# Patient Record
Sex: FEMALE | Race: WHITE
[De-identification: names, ages, dates, MRNs, and addresses within clinical notes are randomized per-mention and may not be internally consistent; named-entity substitution may affect disease eponyms.]

---

## 2018-09-13 ENCOUNTER — HOSPITAL ENCOUNTER (INPATIENT)
Dept: HOSPITAL 14 - H.ER | Age: 38
LOS: 3 days | Discharge: HOME | DRG: 883 | End: 2018-09-16
Attending: SPECIALIST | Admitting: SPECIALIST
Payer: COMMERCIAL

## 2018-09-13 VITALS — BODY MASS INDEX: 24.2 KG/M2

## 2018-09-13 DIAGNOSIS — J45.909: ICD-10-CM

## 2018-09-13 DIAGNOSIS — K35.2: Primary | ICD-10-CM

## 2018-09-13 LAB
ALBUMIN SERPL-MCNC: 4.2 G/DL (ref 3.5–5)
ALBUMIN/GLOB SERPL: 1.3 {RATIO} (ref 1–2.1)
ALT SERPL-CCNC: 26 U/L (ref 9–52)
APTT BLD: 34.3 SECONDS (ref 25.6–37.1)
AST SERPL-CCNC: 24 U/L (ref 14–36)
BACTERIA #/AREA URNS HPF: (no result) /[HPF]
BASOPHILS # BLD AUTO: 0 K/UL (ref 0–0.2)
BASOPHILS NFR BLD: 0.2 % (ref 0–2)
BILIRUB UR-MCNC: NEGATIVE MG/DL
BUN SERPL-MCNC: 12 MG/DL (ref 7–17)
CALCIUM SERPL-MCNC: 8.9 MG/DL (ref 8.4–10.2)
COLOR UR: YELLOW
EOSINOPHIL # BLD AUTO: 0.2 K/UL (ref 0–0.7)
EOSINOPHIL NFR BLD: 1.9 % (ref 0–4)
ERYTHROCYTE [DISTWIDTH] IN BLOOD BY AUTOMATED COUNT: 13.1 % (ref 11.5–14.5)
GFR NON-AFRICAN AMERICAN: > 60
GLUCOSE UR STRIP-MCNC: (no result) MG/DL
HGB BLD-MCNC: 13.5 G/DL (ref 12–16)
INR PPP: 1.1
LEUKOCYTE ESTERASE UR-ACNC: (no result) LEU/UL
LIPASE SERPL-CCNC: 117 U/L (ref 23–300)
LYMPHOCYTES # BLD AUTO: 1.8 K/UL (ref 1–4.3)
LYMPHOCYTES NFR BLD AUTO: 18.2 % (ref 20–40)
MCH RBC QN AUTO: 30.7 PG (ref 27–31)
MCHC RBC AUTO-ENTMCNC: 34.1 G/DL (ref 33–37)
MCV RBC AUTO: 89.9 FL (ref 81–99)
MONOCYTES # BLD: 0.8 K/UL (ref 0–0.8)
MONOCYTES NFR BLD: 7.9 % (ref 0–10)
NEUTROPHILS # BLD: 7 K/UL (ref 1.8–7)
NEUTROPHILS NFR BLD AUTO: 71.8 % (ref 50–75)
NRBC BLD AUTO-RTO: 0.1 % (ref 0–0)
PH UR STRIP: 7 [PH] (ref 5–8)
PLATELET # BLD: 248 K/UL (ref 130–400)
PMV BLD AUTO: 8.7 FL (ref 7.2–11.7)
PROT UR STRIP-MCNC: NEGATIVE MG/DL
PROTHROMBIN TIME: 12.1 SECONDS (ref 9.8–13.1)
RBC # BLD AUTO: 4.4 MIL/UL (ref 3.8–5.2)
RBC # UR STRIP: NEGATIVE /UL
SP GR UR STRIP: 1.02 (ref 1–1.03)
SQUAMOUS EPITHIAL: 13 /HPF (ref 0–5)
URINE CLARITY: (no result)
UROBILINOGEN UR-MCNC: (no result) MG/DL (ref 0.2–1)
WBC # BLD AUTO: 9.7 K/UL (ref 4.8–10.8)

## 2018-09-13 RX ADMIN — WATER SCH MLS/HR: 1 INJECTION INTRAMUSCULAR; INTRAVENOUS; SUBCUTANEOUS at 21:33

## 2018-09-13 NOTE — ED PDOC
HPI: Abdomen


History Per: Patient


History/Exam Limitations: no limitations


Onset/Duration Of Symptoms: Days


Current Symptoms Are (Timing): Still Present


Severity: Moderate


Location Of Pain/Discomfort: RLQ, Epigastric


Associated Symptoms: Loss Of Appetite





<Aiden Beatty - Last Filed: 09/13/18 17:22>





<Jace Mendoza III - Last Filed: 10/02/18 14:36>


Time Seen by Provider: 09/13/18 12:15


Chief Complaint (Nursing): Abdominal Pain


Additional Complaint(s): 


37-year-old female, presents to the emergency department with complaints of 

epigastric abdominal pain ongoing for the past three days. Pain is intermittent 

in nature and associated with a decreased appetite. Pt states pain became more 

constant over the past two days, radiating to the right lower quadrant. LMP 

08/22. Patient denies hx of abdominal surgeries, nausea/vomiting, diarrhea, 

dysuria, fever or chills. No other complaints at this time. (Aiden Beatty)


37-year-old female, presents to the emergency department with complaints of 

epigastric abdominal pain ongoing for the past three days. Pain is intermittent 

in nature and associated with a decreased appetite. Pt states pain became more 

constant over the past two days, radiating to the right lower quadrant. LMP 

08/22. Patient denies hx of abdominal surgeries, nausea/vomiting, diarrhea, 

dysuria, fever or chills. No other complaints at this time. (Jace Mendoza III)





Supervising Attending Note





- Attestation:


I have reviewed all pertinent clinical information: Yes





<Jace Mendoza III - Last Filed: 10/02/18 14:36>





Past Medical History


Reviewed: Historical Data, Vital Signs





- Medical History


PMH: Asthma





- Family History


Family History: States: No Known Family Hx





<Aiden Beatty - Last Filed: 09/13/18 17:22>





<Jace Mendoza III - Last Filed: 10/02/18 14:36>


Vital Signs: 





                                Last Vital Signs











Temp  98.3 F   09/16/18 07:55


 


Pulse  65   09/16/18 07:55


 


Resp  18   09/16/18 07:55


 


BP  96/63 L  09/16/18 07:55


 


Pulse Ox  99   09/16/18 07:55














- Home Medications


Home Medications: 


                                Ambulatory Orders











 Medication  Instructions  Recorded


 


Albuterol HFA [Ventolin HFA 90 2 puff IH PRN PRN 05/31/16





mcg/actuation (8 g)]  


 


Docusate Sodium [Colace] 100 mg PO BID #30 capsule 09/16/18


 


RX: Ibuprofen [Motrin Tab] 800 mg PO Q8H PRN #21 tab 09/16/18














- Allergies


Allergies/Adverse Reactions: 


                                    Allergies











Allergy/AdvReac Type Severity Reaction Status Date / Time


 


No Known Allergies Allergy   Verified 05/27/16 12:53














Physical Exam





- Reviewed


Nursing Documentation Reviewed: Yes


Vital Signs Reviewed: Yes





- Physical Exam


Appears: Positive for: Non-toxic, No Acute Distress


Head Exam: Positive for: ATRAUMATIC, NORMOCEPHALIC


Skin: Positive for: Normal Color, Warm, Dry.  Negative for: Rash


Eye Exam: Positive for: Normal appearance


Neck: Positive for: Painless ROM


Cardiovascular/Chest: Positive for: Regular Rate, Rhythm.  Negative for: Murmur


Respiratory: Positive for: Normal Breath Sounds.  Negative for: Decreased Breath

Sounds, Accessory Muscle Use


Gastrointestinal/Abdominal: Positive for: Soft, Tenderness (+RLQ, Mild 

epigastric.).  Negative for: Mass, Guarding, Rebound


Back: Negative for: L CVA Tenderness, R CVA Tenderness


Extremity: Positive for: Normal ROM.  Negative for: Deformity


Neurologic/Psych: Positive for: Alert





<Aiden Baetty - Last Filed: 09/13/18 17:22>





- Laboratory Results


Result Diagrams: 


                                 09/13/18 13:20





                                 09/13/18 13:20





- ECG


O2 Sat by Pulse Oximetry: 99


Pulse Ox Interpretation: Normal (RA)





<Aiden Beatty - Last Filed: 09/13/18 17:22>





- Laboratory Results


Result Diagrams: 


                                 09/16/18 05:24





                                 09/15/18 08:50





<Jace Mendoza III - Last Filed: 10/02/18 14:36>





- Progress


ED Course And Treament: 








NS  1 LITER 500 ML PER HOUR:





TRANSVAGINAL US:





IMPRESSION:


No evidence of fibroid uterus or focal endometrial abnormality.





Moderate amount of free fluid in the pelvis which could be physiologic or 

related to cyst rupture.








CT ABD/PELVIS:





IMPRESSION:


Acute appendicitis with inflammatory changes extending to the terminal ileum and

cecum consistent with ruptured appendix and walled-off phlegmon.





Communication of results: I discussed findings directly with physician assistant

Aiden Beatty in the emergency department at 16:11





CALL PLACED TO SURG 16:18








D/W SURG RES 16:24.  WE WILL CALL ATTENDING TO DISCUSS AS THEY ARE AT [facility] 

[facility]





D/W DR. BERUMEN 16:48; ADMIT TO FRANNIE; KEEP NPO; ZYOSYN 3.375GM Q8 (Aiden Beatty)








NS  1 LITER 500 ML PER HOUR:





TRANSVAGINAL US:





IMPRESSION:


No evidence of fibroid uterus or focal endometrial abnormality.





Moderate amount of free fluid in the pelvis which could be physiologic or 

related to cyst rupture.








CT ABD/PELVIS:





IMPRESSION:


Acute appendicitis with inflammatory changes extending to the terminal ileum and

cecum consistent with ruptured appendix and walled-off phlegmon.





Communication of results: I discussed findings directly with physician assistant

Aiden Beatty in the emergency department at 16:11





CALL PLACED TO SURG 16:18








D/W SURG RES 16:24.  WE WILL CALL ATTENDING TO DISCUSS AS THEY ARE AT [facility] IN 

[facility] 3.375GM IV





D/W DR. BERUMEN 16:48; ADMIT TO FRANNIE; KEEP NPO; ZYOSYN 3.375GM Q8 (Jace Mendoza III)





Medical Decision Making





<Aiden Beatty - Last Filed: 09/13/18 17:22>





<Jace Mendoza III - Last Filed: 10/02/18 14:36>


Medical Decision Making: 


Impression:


Abdominal pain





Plan:


* Bloodwork


* CT Abd/Pel


* UA


* Reassess and Disposition





Scribe Attestation:


Documented by Rebecca Gallegos, acting as a scribe for LUIS DANIEL Mendoza. 


 


Provider Scribe Attestation:


All medical record entries made by the Scribe were at my direction and 

personally dictated by me. I have reviewed the chart and agree that the record 

accurately reflects my personal performance of the history, physical exam, 

medical decision making, and the department course for this patient. I have also

personally directed, reviewed, and agree with the discharge instructions and 

disposition.


 (Aiden Beatty)


Impression:


Abdominal pain





Plan:


* Bloodwork


* CT Abd/Pel


* UA


* Reassess and Disposition





Scribe Attestation:


Documented by Rebecca Gallegos, acting as a scribe for LUIS DANIEL Mendoza. 


 


Provider Scribe Attestation:


All medical record entries made by the Scribe were at my direction and 

personally dictated by me. I have reviewed the chart and agree that the record 

accurately reflects my personal performance of the history, physical exam, m

edical decision making, and the department course for this patient. I have also 

personally directed, reviewed, and agree with the discharge instructions and 

disposition.


 (Jace Mendoza III)





Disposition





- Patient ED Disposition


Is Patient to be Admitted: Yes





- Disposition


Disposition Time: 16:48





- Pt Status Changed To:


Hospital Disposition Of: Inpatient





- Admit Certification


Admit to Inpatient:: After my assessment, the patient will require 

hospitalization for at least two midnights.  This is because of the severity of 

symptoms shown, intensity of services needed, and/or the medical risk in this 

patient being treated as an outpatient.





<Aiden Beatty - Last Filed: 09/13/18 17:22>





<Jace Mendoza III - Last Filed: 10/02/18 14:36>





- Clinical Impression


Clinical Impression: 


 Appendicitis








- Disposition


Condition: STABLE

## 2018-09-13 NOTE — US
Date of service: 



09/13/2018



HISTORY:

r/o ovarian cyst right lower quadrant



COMPARISON:

None available.



TECHNIQUE:

Transvaginal pelvic ultrasound was performed.



FINDINGS:



UTERUS:

Measures 8.2 x 5.8 x 4.3 cm. Anteverted, normal in size and 

appearance. No fibroid or other mass lesion seen.



ENDOMETRIUM:

Measures 12 mm in diameter. Unremarkable. 



CERVIX:

There are multiple nabothian cysts.



RIGHT OVARY:

Measures 3.9 x 3.6 x 1.9 cm. No solid mass. Normal flow. 



LEFT OVARY:

Measures 3.8 x 3.8 x 2.1 cm. No solid mass. Normal flow. 



FREE FLUID:

There is moderate amount of free fluid in the pelvis. 



OTHER FINDINGS:

None. 



IMPRESSION:

No evidence of fibroid uterus or focal endometrial abnormality.



Moderate amount of free fluid in the pelvis which could be 

physiologic or related to cyst rupture.

## 2018-09-13 NOTE — CT
Date of service: 



09/13/2018



PROCEDURE:  CT Abdomen and Pelvis with contrast



HISTORY:

r/o appendicitis



COMPARISON:

None.



TECHNIQUE:

Contrast dose: 95 cc Omnipaque 300.



Radiation dose:



Total exam DLP = 396.16 mGy-cm.



This CT exam was performed using one or more of the following dose 

reduction techniques: Automated exposure control, adjustment of the 

mA and/or kV according to patient size, and/or use of iterative 

reconstruction technique.



FINDINGS:



LOWER THORAX:

Unremarkable. 



LIVER:

Unremarkable. No gross lesion or ductal dilatation. 



GALLBLADDER AND BILE DUCTS:

Unremarkable. 



PANCREAS:

Unremarkable. No gross lesion or ductal dilatation.



SPLEEN:

Unremarkable. 



ADRENALS:

Unremarkable. No mass. 



KIDNEYS AND URETERS:

Unremarkable. No hydronephrosis. No solid mass. 



VASCULATURE:

Unremarkable. No aortic aneurysm. 



BOWEL:

Unremarkable. No obstruction. No gross mural thickening. 



APPENDIX:

Acute appendicitis.  The appendix is dilated common the wall is 

thickened with contrast enhancement.  There is localized inflammatory 

change likely ruptured appendix with circumferential inflammatory 

changes about the cecum. Inflammatory changes also extend to the 

terminal ileum. Clearly the epicenter these findings are about the 

appendix. 



PERITONEUM:

Trace free fluid identified in the pelvis/cul de sac. No free air. 



LYMPH NODES:

Unremarkable. No enlarged lymph nodes. 



BLADDER:

Unremarkable. 



REPRODUCTIVE:

Unremarkable. 



BONES:

No acute fracture. 



OTHER FINDINGS:

None.



IMPRESSION:

Acute appendicitis with inflammatory changes extending to the 

terminal ileum and cecum consistent with ruptured appendix and 

walled-off phlegmon.



Communication of results: I discussed findings directly with 

physician assistant Aiden Beatty in the emergency department at 16:11

## 2018-09-13 NOTE — CP.PCM.HP
History of Present Illness





- History of Present Illness


History of Present Illness: 





Surgery: Dr. Shaver





CC: Abd pain





HPI: 37F presents with abd pain since Monday. Pain started off as kristopher-umbilical

, then migrated to RLQ. Pt states that pain is constant, worse with activity. 

She denies changes in appetite, no N/V/D, no F/C. Only complaints is pain.  In 

ED CT was done with findings consistent with ruptured appendicitis w. 

phelgmonous changes.





PMH: Asthma


PSH: none


Meds: Albuterol


NKDA


Social hx: No ETOH/tobacco/drugs


Fhx: non-contributory





Present on Admission





- Present on Admission


Any Indicators Present on Admission: No





Review of Systems





- Review of Systems


All systems: reviewed and no additional remarkable complaints except (HPI)





Past Patient History





- Past Medical History & Family History


Past Medical History?: Yes





- Past Social History


Smoking Status: Never Smoked





- CARDIAC


Hx Cardiac Disorders: No





- PULMONARY


Hx Asthma: Yes





- NEUROLOGICAL


Hx Neurological Disorder: No





- HEENT


Hx HEENT Problems: No





- RENAL


Hx Chronic Kidney Disease: No





- ENDOCRINE/METABOLIC


Hx Endocrine Disorders: No





- HEMATOLOGICAL/ONCOLOGICAL


Hx Blood Disorders: No





- INTEGUMENTARY


Hx Dermatological Problems: No





- MUSCULOSKELETAL/RHEUMATOLOGICAL


Hx Musculoskeletal Disorders: No


Hx Falls: No





- GASTROINTESTINAL


Hx Gastrointestinal Disorders: No





- GENITOURINARY/GYNECOLOGICAL


Hx Genitourinary Disorders: Yes


Other/Comment: Uterine fibroids





- PSYCHIATRIC


Hx Emotional Abuse: No


Hx Physical Abuse: No


Hx Substance Use: No





- SURGICAL HISTORY


Hx Surgeries: No





- ANESTHESIA


Hx Anesthesia: Yes


Hx Anesthesia Reactions: No


Hx Malignant Hyperthermia: No





Meds


Allergies/Adverse Reactions: 


 Allergies











Allergy/AdvReac Type Severity Reaction Status Date / Time


 


No Known Allergies Allergy   Verified 05/27/16 12:53














Physical Exam





- Constitutional


Appears: Non-toxic, No Acute Distress





- Head Exam


Head Exam: ATRAUMATIC, NORMOCEPHALIC





- Eye Exam


Eye Exam: EOMI





- ENT Exam


ENT Exam: Mucous Membranes Moist





- Neck Exam


Neck exam: Positive for: Full Rom





- Respiratory Exam


Respiratory Exam: NORMAL BREATHING PATTERN.  absent: Accessory Muscle Use, 

Respiratory Distress





- GI/Abdominal Exam


GI & Abdominal Exam: Soft, Tenderness (RLQ).  absent: Distended, Firm, Guarding

, Rebound


Additional comments: 





+Obturator sign, (-) psoas sign





- Extremities Exam


Extremities exam: Negative for: calf tenderness, pedal edema





- Neurological Exam


Neurological exam: Alert, Oriented x3





Results





- Vital Signs


Recent Vital Signs: 





 Last Vital Signs











Temp  98.2 F   09/13/18 17:55


 


Pulse  74   09/13/18 17:55


 


Resp  20   09/13/18 18:23


 


BP  112/73   09/13/18 17:55


 


Pulse Ox  99   09/13/18 18:23














- Labs


Result Diagrams: 


 09/13/18 13:20





 09/13/18 13:20


Labs: 





 Laboratory Results - last 24 hr











  09/13/18 09/13/18 09/13/18





  12:30 13:20 13:20


 


WBC   9.7 


 


RBC   4.40 


 


Hgb   13.5 


 


Hct   39.6 


 


MCV   89.9 


 


MCH   30.7 


 


MCHC   34.1 


 


RDW   13.1 


 


Plt Count   248 


 


MPV   8.7 


 


Neut % (Auto)   71.8 


 


Lymph % (Auto)   18.2 L 


 


Mono % (Auto)   7.9 


 


Eos % (Auto)   1.9 


 


Baso % (Auto)   0.2 


 


Neut # (Auto)   7.0 


 


Lymph # (Auto)   1.8 


 


Mono # (Auto)   0.8 


 


Eos # (Auto)   0.2 


 


Baso # (Auto)   0.0 


 


PT   


 


INR   


 


APTT   


 


Sodium    140


 


Potassium    3.5 L


 


Chloride    104


 


Carbon Dioxide    30


 


Anion Gap    10


 


BUN    12


 


Creatinine    0.7


 


Est GFR ( Amer)    > 60


 


Est GFR (Non-Af Amer)    > 60


 


Random Glucose    61 L


 


Calcium    8.9


 


Total Bilirubin    0.4


 


AST    24


 


ALT    26


 


Alkaline Phosphatase    87


 


Total Protein    7.4


 


Albumin    4.2


 


Globulin    3.2


 


Albumin/Globulin Ratio    1.3


 


Lipase    117


 


Urine Color   


 


Urine Clarity   


 


Urine pH   


 


Ur Specific Gravity   


 


Urine Protein   


 


Urine Glucose (UA)   


 


Urine Ketones   


 


Urine Blood   


 


Urine Nitrate   


 


Urine Bilirubin   


 


Urine Urobilinogen   


 


Ur Leukocyte Esterase   


 


Urine RBC (Auto)   


 


Urine Microscopic WBC   


 


Ur Squamous Epith Cells   


 


Urine Bacteria   


 


Blood Type  O POSITIVE  


 


Antibody Screen  Negative  


 


BBK History Checked  Patient has bt  














  09/13/18 09/13/18





  13:20 13:20


 


WBC  


 


RBC  


 


Hgb  


 


Hct  


 


MCV  


 


MCH  


 


MCHC  


 


RDW  


 


Plt Count  


 


MPV  


 


Neut % (Auto)  


 


Lymph % (Auto)  


 


Mono % (Auto)  


 


Eos % (Auto)  


 


Baso % (Auto)  


 


Neut # (Auto)  


 


Lymph # (Auto)  


 


Mono # (Auto)  


 


Eos # (Auto)  


 


Baso # (Auto)  


 


PT  12.1 


 


INR  1.1 


 


APTT  34.3 


 


Sodium  


 


Potassium  


 


Chloride  


 


Carbon Dioxide  


 


Anion Gap  


 


BUN  


 


Creatinine  


 


Est GFR ( Amer)  


 


Est GFR (Non-Af Amer)  


 


Random Glucose  


 


Calcium  


 


Total Bilirubin  


 


AST  


 


ALT  


 


Alkaline Phosphatase  


 


Total Protein  


 


Albumin  


 


Globulin  


 


Albumin/Globulin Ratio  


 


Lipase  


 


Urine Color   Yellow


 


Urine Clarity   Sl cloudy


 


Urine pH   7.0


 


Ur Specific Gravity   1.018


 


Urine Protein   Negative


 


Urine Glucose (UA)   Neg


 


Urine Ketones   Negative


 


Urine Blood   Negative


 


Urine Nitrate   Negative


 


Urine Bilirubin   Negative


 


Urine Urobilinogen   0.2-1.0


 


Ur Leukocyte Esterase   Small


 


Urine RBC (Auto)   7 H


 


Urine Microscopic WBC   2


 


Ur Squamous Epith Cells   13 H


 


Urine Bacteria   Rare


 


Blood Type  


 


Antibody Screen  


 


BBK History Checked  














- Imaging and Cardiology


  ** CT scan - abdomen


Status: Image reviewed by me, Report reviewed by me





Assessment & Plan





- Assessment and Plan (Free Text)


Assessment: 





37F w. perforated appendicitis


-IVF


-zosyn


-will reassess in AM, if pain resolved will plan for interval appendectomy, if 

pain persists will plan for OR


-d/w attending





Zemaitis PGY4





Decision To Admit





- Pt Status Changed To:


Hospital Disposition Of: Inpatient





- Admit Certification


Admit to Inpatient:: After my assessment, the patient will require 

hospitalization for at least two midnights.  This is because of the severity of 

symptoms shown, intensity of services needed, and/or the medical risk in this 

patient being treated as an outpatient.





- .


Bed Request Type: Med/Surg


Admitting Physician: Milton Shaver

## 2018-09-14 LAB
BASOPHILS # BLD AUTO: 0 K/UL (ref 0–0.2)
BASOPHILS NFR BLD: 0.2 % (ref 0–2)
BUN SERPL-MCNC: 8 MG/DL (ref 7–17)
CALCIUM SERPL-MCNC: 8.3 MG/DL (ref 8.4–10.2)
EOSINOPHIL # BLD AUTO: 0.3 K/UL (ref 0–0.7)
EOSINOPHIL NFR BLD: 3 % (ref 0–4)
ERYTHROCYTE [DISTWIDTH] IN BLOOD BY AUTOMATED COUNT: 13.1 % (ref 11.5–14.5)
GFR NON-AFRICAN AMERICAN: > 60
HGB BLD-MCNC: 12.8 G/DL (ref 12–16)
LYMPHOCYTES # BLD AUTO: 1.9 K/UL (ref 1–4.3)
LYMPHOCYTES NFR BLD AUTO: 22.8 % (ref 20–40)
MCH RBC QN AUTO: 30.4 PG (ref 27–31)
MCHC RBC AUTO-ENTMCNC: 33.7 G/DL (ref 33–37)
MCV RBC AUTO: 90.2 FL (ref 81–99)
MONOCYTES # BLD: 0.7 K/UL (ref 0–0.8)
MONOCYTES NFR BLD: 8 % (ref 0–10)
NEUTROPHILS # BLD: 5.6 K/UL (ref 1.8–7)
NEUTROPHILS NFR BLD AUTO: 66 % (ref 50–75)
NRBC BLD AUTO-RTO: 0 % (ref 0–0)
PLATELET # BLD: 219 K/UL (ref 130–400)
PMV BLD AUTO: 8.1 FL (ref 7.2–11.7)
RBC # BLD AUTO: 4.2 MIL/UL (ref 3.8–5.2)
WBC # BLD AUTO: 8.5 K/UL (ref 4.8–10.8)

## 2018-09-14 PROCEDURE — 0DTJ4ZZ RESECTION OF APPENDIX, PERCUTANEOUS ENDOSCOPIC APPROACH: ICD-10-PCS | Performed by: SPECIALIST

## 2018-09-14 RX ADMIN — WATER SCH MLS/HR: 1 INJECTION INTRAMUSCULAR; INTRAVENOUS; SUBCUTANEOUS at 09:04

## 2018-09-14 RX ADMIN — HYDROMORPHONE HYDROCHLORIDE PRN MG: 1 INJECTION, SOLUTION INTRAMUSCULAR; INTRAVENOUS; SUBCUTANEOUS at 23:50

## 2018-09-14 RX ADMIN — WATER SCH MLS/HR: 1 INJECTION INTRAMUSCULAR; INTRAVENOUS; SUBCUTANEOUS at 04:29

## 2018-09-14 RX ADMIN — WATER SCH MLS: 1 INJECTION INTRAMUSCULAR; INTRAVENOUS; SUBCUTANEOUS at 22:00

## 2018-09-14 RX ADMIN — WATER SCH MLS/HR: 1 INJECTION INTRAMUSCULAR; INTRAVENOUS; SUBCUTANEOUS at 15:56

## 2018-09-14 NOTE — CP.PCM.PN
Subjective





- Date & Time of Evaluation


Date of Evaluation: 09/14/18


Time of Evaluation: 07:25





- Subjective


Subjective: 





General Surgery Note for Dr. Shaver





Patient seen and examined at bedside. No acute event overnight. Patient states 

abdominal pain has slightly improved. She was afebrile overnight. Denies fever/

chills, nausea/vomting or diarrhea. Patient admits to flatus and BM. She 

remains NPO.





Objective





- Vital Signs/Intake and Output


Vital Signs (last 24 hours): 


 











Temp Pulse Resp BP Pulse Ox


 


 98.4 F   71   18   101/66   97 


 


 09/14/18 05:00  09/14/18 05:00  09/14/18 05:00  09/14/18 05:00  09/14/18 05:00











- Medications


Medications: 


 Current Medications





Acetaminophen (Tylenol 325mg Tab)  650 mg PO Q6 PRN


   PRN Reason: Headache


   Last Admin: 09/13/18 21:32 Dose:  650 mg


Hydromorphone HCl (Dilaudid)  0.5 mg IVP Q4H PRN


   PRN Reason: Pain, moderate (4-7)


Sodium Chloride (Sodium Chloride 0.9%)  1,000 mls @ 100 mls/hr IV .Q10H VICTORINO


   Last Admin: 09/14/18 05:31 Dose:  100 mls/hr


Piperacillin Sod/Tazobactam (Sod 3.375 gm/ Sodium Chloride)  100 mls @ 100 mls/

hr IVPB Q6 VICTORINO


   PRN Reason: Protocol


   Last Admin: 09/14/18 04:29 Dose:  100 mls/hr


Ondansetron HCl (Zofran Inj)  4 mg IVP Q6 PRN


   PRN Reason: Nausea/Vomiting


Sennosides (Senokot Tab)  17.2 mg PO HS VICTORINO


   Last Admin: 09/13/18 21:38 Dose:  Not Given











- Labs


Labs: 


 





 09/14/18 05:20 





 09/14/18 05:20 





 











PT  12.1 Seconds (9.8-13.1)   09/13/18  13:20    


 


INR  1.1   09/13/18  13:20    


 


APTT  34.3 Seconds (25.6-37.1)   09/13/18  13:20    














- Additional Findings


Additional findings: 





- Constitutional


Appears: Non-toxic, No Acute Distress





- Head Exam


Head Exam: ATRAUMATIC, NORMOCEPHALIC





- Eye Exam


Eye Exam: EOMI





- ENT Exam


ENT Exam: Mucous Membranes Moist





- Neck Exam


Neck exam: Positive for: Full Rom





- Respiratory Exam


Respiratory Exam: NORMAL BREATHING PATTERN.  absent: Accessory Muscle Use, 

Respiratory Distress





- GI/Abdominal Exam


GI & Abdominal Exam: Soft, Tenderness (RLQ).  absent: Distended, Firm, Guarding

, Rebound


Additional comments: 





(+) Mcburney's point and Obturator sign





- Extremities Exam


Extremities exam: Negative for: calf tenderness, pedal edema





- Neurological Exam


Neurological exam: Alert, Oriented x3





Assessment and Plan





- Assessment and Plan (Free Text)


Assessment: 


37F with perforated appendicitis and phlegmonous changes


Plan:


-NPO


-IVF


-zosyn


-Analgesics/Anti-emetics PRN


-Monitor for fevers


-Serial abd exams


-Strict I's & O's


-Further recommendations as per Dr. Chhaya Edwards PGY2

## 2018-09-14 NOTE — PCM.SURG1
Surgeon's Initial Post Op Note





- Surgeon's Notes


Surgeon: Chhaya


Assistant:  PGY4


Type of Anesthesia: General Endo, Local


Pre-Operative Diagnosis: Phlegmonous appendicitis


Operative Findings: see op note


Post-Operative Diagnosis: Phlegmonous appendicitis


Operation Performed: Laparoscopic appendectomy


Specimen/Specimens Removed: appendix


Estimated Blood Loss: EBL {In ML}: 10


Blood Products Given: N/A


Drains Used: No Drains


Post-Op Condition: Good


Date of Surgery/Procedure: 09/14/18


Time of Surgery/Procedure: 09:50

## 2018-09-15 VITALS — RESPIRATION RATE: 18 BRPM

## 2018-09-15 LAB
ALBUMIN SERPL-MCNC: 3.7 G/DL (ref 3.5–5)
ALBUMIN/GLOB SERPL: 1.1 {RATIO} (ref 1–2.1)
ALT SERPL-CCNC: 24 U/L (ref 9–52)
ANISOCYTOSIS BLD QL SMEAR: SLIGHT
AST SERPL-CCNC: 23 U/L (ref 14–36)
BASOPHILS # BLD AUTO: 0 K/UL (ref 0–0.2)
BASOPHILS NFR BLD: 0.2 % (ref 0–2)
BUN SERPL-MCNC: 11 MG/DL (ref 7–17)
CALCIUM SERPL-MCNC: 8.5 MG/DL (ref 8.4–10.2)
EOSINOPHIL # BLD AUTO: 0 K/UL (ref 0–0.7)
EOSINOPHIL NFR BLD: 0 % (ref 0–4)
ERYTHROCYTE [DISTWIDTH] IN BLOOD BY AUTOMATED COUNT: 12.8 % (ref 11.5–14.5)
GFR NON-AFRICAN AMERICAN: > 60
HGB BLD-MCNC: 12.8 G/DL (ref 12–16)
HYPOCHROMIC: SLIGHT
LG PLATELETS BLD QL SMEAR: PRESENT
LYMPHOCYTE: 11 % (ref 20–50)
LYMPHOCYTES # BLD AUTO: 0.9 K/UL (ref 1–4.3)
LYMPHOCYTES NFR BLD AUTO: 8.6 % (ref 20–40)
MCH RBC QN AUTO: 30.3 PG (ref 27–31)
MCHC RBC AUTO-ENTMCNC: 33.5 G/DL (ref 33–37)
MCV RBC AUTO: 90.4 FL (ref 81–99)
MICROCYTES BLD QL SMEAR: SLIGHT
MONOCYTE: 3 % (ref 0–10)
MONOCYTES # BLD: 0.4 K/UL (ref 0–0.8)
MONOCYTES NFR BLD: 4.2 % (ref 0–10)
NEUTROPHILS # BLD: 9.2 K/UL (ref 1.8–7)
NEUTROPHILS NFR BLD AUTO: 86 % (ref 42–75)
NEUTROPHILS NFR BLD AUTO: 87 % (ref 50–75)
NRBC BLD AUTO-RTO: 0.1 % (ref 0–0)
OVALOCYTES BLD QL SMEAR: SLIGHT
PLATELET # BLD EST: NORMAL 10*3/UL
PLATELET # BLD: 234 K/UL (ref 130–400)
PMV BLD AUTO: 9.4 FL (ref 7.2–11.7)
RBC # BLD AUTO: 4.23 MIL/UL (ref 3.8–5.2)
TOTAL CELLS COUNTED BLD: 100
WBC # BLD AUTO: 10.6 K/UL (ref 4.8–10.8)

## 2018-09-15 RX ADMIN — WATER SCH MLS/HR: 1 INJECTION INTRAMUSCULAR; INTRAVENOUS; SUBCUTANEOUS at 15:57

## 2018-09-15 RX ADMIN — WATER SCH MLS/HR: 1 INJECTION INTRAMUSCULAR; INTRAVENOUS; SUBCUTANEOUS at 10:02

## 2018-09-15 RX ADMIN — WATER SCH MLS/HR: 1 INJECTION INTRAMUSCULAR; INTRAVENOUS; SUBCUTANEOUS at 04:34

## 2018-09-15 RX ADMIN — WATER SCH MLS/HR: 1 INJECTION INTRAMUSCULAR; INTRAVENOUS; SUBCUTANEOUS at 21:51

## 2018-09-15 RX ADMIN — HYDROMORPHONE HYDROCHLORIDE PRN MG: 1 INJECTION, SOLUTION INTRAMUSCULAR; INTRAVENOUS; SUBCUTANEOUS at 00:25

## 2018-09-15 NOTE — CP.PCM.PN
Subjective





- Date & Time of Evaluation


Date of Evaluation: 09/15/18


Time of Evaluation: 07:10





- Subjective


Subjective: 


General Surgery


Pt seen and examined. OOB in chair. Tolerating sips of clears. Has some pain 

this AM but it is different from prior to surgery, most in RLQ and is controlled

by meds. Denies nausea, emesis, fever, chills. No BM/flatus. 





Objective





- Vital Signs/Intake and Output


Vital Signs (last 24 hours): 


                                        











Temp Pulse Resp BP Pulse Ox


 


 97.7 F   81   20   110/67   100 


 


 09/15/18 08:14  09/15/18 08:25  09/15/18 08:14  09/15/18 08:14  09/15/18 08:14








Intake and Output: 


                                        











 09/15/18 09/15/18





 06:59 18:59


 


Intake Total 200 


 


Balance 200 














- Medications


Medications: 


                               Current Medications





Acetaminophen (Tylenol 325mg Tab)  650 mg PO Q6 PRN


   PRN Reason: Headache


   Last Admin: 09/13/18 21:32 Dose:  650 mg


Hydromorphone HCl (Dilaudid)  0.5 mg IVP Q4H PRN


   PRN Reason: Pain, moderate (4-7)


   Last Admin: 09/15/18 06:10 Dose:  0.5 mg


Piperacillin Sod/Tazobactam (Sod 3.375 gm/ Sodium Chloride)  100 mls @ 100 

mls/hr IVPB Q6 VICTORINO


   PRN Reason: Protocol


   Last Admin: 09/15/18 04:34 Dose:  100 mls/hr


Lactated Ringer's (Lactated Ringer's)  1,000 mls @ 110 mls/hr IV .Q9H6M VICTORINO


   Last Admin: 09/15/18 06:43 Dose:  Not Given


Lactated Ringer's (Lactated Ringer's)  1,000 mls @ 100 mls/hr IV .Q10H VICTORINO


   Last Admin: 09/15/18 00:42 Dose:  100 mls


Ondansetron HCl (Zofran Inj)  4 mg IVP Q6 PRN


   PRN Reason: Nausea/Vomiting


   Last Admin: 09/15/18 06:45 Dose:  4 mg


Sennosides (Senokot Tab)  17.2 mg PO HS VICTORINO


   Last Admin: 09/14/18 22:00 Dose:  Not Given











- Labs


Labs: 


                                        





                                 09/14/18 05:20 





                                 09/14/18 05:20 





                                        











PT  12.1 Seconds (9.8-13.1)   09/13/18  13:20    


 


INR  1.1   09/13/18  13:20    


 


APTT  34.3 Seconds (25.6-37.1)   09/13/18  13:20    














- Constitutional


Appears: Non-toxic, No Acute Distress





- Head Exam


Head Exam: ATRAUMATIC, NORMOCEPHALIC





- Eye Exam


Eye Exam: EOMI.  absent: Scleral icterus





- Respiratory Exam


Respiratory Exam: NORMAL BREATHING PATTERN.  absent: Respiratory Distress





- GI/Abdominal Exam


GI & Abdominal Exam: Soft, Tenderness (in RLQ and near incision sites. ).  

absent: Distended, Firm, Guarding, Rigid, Rebound


Additional comments: 





incisions C/D/I





- Neurological Exam


Neurological Exam: Alert, Awake, Oriented x3





- Skin


Skin Exam: Dry, Warm





Assessment and Plan





- Assessment and Plan (Free Text)


Assessment: 


37F S/P lap appy, POD#1


Plan: 


f/u AM labs


analgesia PRN


zofran PRN


Monitor for diet tolerance


Continue Abx


Encouraged ambulation and CPT/IS use


Will D/W Dr. Chhaya Grissom PGY4

## 2018-09-16 VITALS
HEART RATE: 65 BPM | OXYGEN SATURATION: 99 % | TEMPERATURE: 98.3 F | SYSTOLIC BLOOD PRESSURE: 96 MMHG | DIASTOLIC BLOOD PRESSURE: 63 MMHG

## 2018-09-16 LAB
BASOPHILS # BLD AUTO: 0 K/UL (ref 0–0.2)
BASOPHILS NFR BLD: 0.3 % (ref 0–2)
EOSINOPHIL # BLD AUTO: 0.1 K/UL (ref 0–0.7)
EOSINOPHIL NFR BLD: 1 % (ref 0–4)
ERYTHROCYTE [DISTWIDTH] IN BLOOD BY AUTOMATED COUNT: 12.5 % (ref 11.5–14.5)
HGB BLD-MCNC: 11.4 G/DL (ref 12–16)
LYMPHOCYTES # BLD AUTO: 1.9 K/UL (ref 1–4.3)
LYMPHOCYTES NFR BLD AUTO: 27.9 % (ref 20–40)
MCH RBC QN AUTO: 30.6 PG (ref 27–31)
MCHC RBC AUTO-ENTMCNC: 34.1 G/DL (ref 33–37)
MCV RBC AUTO: 89.6 FL (ref 81–99)
MONOCYTES # BLD: 0.6 K/UL (ref 0–0.8)
MONOCYTES NFR BLD: 8.5 % (ref 0–10)
NEUTROPHILS # BLD: 4.3 K/UL (ref 1.8–7)
NEUTROPHILS NFR BLD AUTO: 62.3 % (ref 50–75)
NRBC BLD AUTO-RTO: 0 % (ref 0–0)
PLATELET # BLD: 213 K/UL (ref 130–400)
PMV BLD AUTO: 8.2 FL (ref 7.2–11.7)
RBC # BLD AUTO: 3.74 MIL/UL (ref 3.8–5.2)
WBC # BLD AUTO: 6.9 K/UL (ref 4.8–10.8)

## 2018-09-16 RX ADMIN — WATER SCH MLS/HR: 1 INJECTION INTRAMUSCULAR; INTRAVENOUS; SUBCUTANEOUS at 04:00

## 2018-09-16 NOTE — CP.PCM.DIS
Provider





- Provider


Date of Admission: 


09/13/18 17:21





Attending physician: 


Milton Shaver MD





Consults: 





NONE


Time Spent in preparation of Discharge (in minutes): 45





Diagnosis





- Discharge Diagnosis


(1) Ruptured appendicitis


Status: Acute   


Comment: s/p laparoscopic appendectomy   





Hospital Course





- Lab Results


Lab Results: 


                                  Micro Results





09/13/18 13:20   Urine   Urine Culture - Final


                            No Growth (<1,000 CFU/ML)





                             Most Recent Lab Values











WBC  6.9 K/uL (4.8-10.8)   09/16/18  05:24    


 


RBC  3.74 Mil/uL (3.80-5.20)  L  09/16/18  05:24    


 


Hgb  11.4 g/dL (12.0-16.0)  L  09/16/18  05:24    


 


Hct  33.5 % (34.0-47.0)  L  09/16/18  05:24    


 


MCV  89.6 fl (81.0-99.0)   09/16/18  05:24    


 


MCH  30.6 pg (27.0-31.0)   09/16/18  05:24    


 


MCHC  34.1 g/dL (33.0-37.0)   09/16/18  05:24    


 


RDW  12.5 % (11.5-14.5)   09/16/18  05:24    


 


Plt Count  213 K/uL (130-400)   09/16/18  05:24    


 


MPV  8.2 fl (7.2-11.7)   09/16/18  05:24    


 


Neut % (Auto)  62.3 % (50.0-75.0)   09/16/18  05:24    


 


Lymph % (Auto)  27.9 % (20.0-40.0)   09/16/18  05:24    


 


Mono % (Auto)  8.5 % (0.0-10.0)   09/16/18  05:24    


 


Eos % (Auto)  1.0 % (0.0-4.0)   09/16/18  05:24    


 


Baso % (Auto)  0.3 % (0.0-2.0)   09/16/18  05:24    


 


Neut # (Auto)  4.3 K/uL (1.8-7.0)   09/16/18  05:24    


 


Lymph # (Auto)  1.9 K/uL (1.0-4.3)   09/16/18  05:24    


 


Mono # (Auto)  0.6 K/uL (0.0-0.8)   09/16/18  05:24    


 


Eos # (Auto)  0.1 K/uL (0.0-0.7)   09/16/18  05:24    


 


Baso # (Auto)  0.0 K/uL (0.0-0.2)   09/16/18  05:24    


 


Neutrophils % (Manual)  86 % (42-75)  H  09/15/18  08:50    


 


Lymphocytes % (Manual)  11 % (20-50)  L  09/15/18  08:50    


 


Monocytes % (Manual)  3 % (0-10)   09/15/18  08:50    


 


Platelet Estimate  Normal  (NORMAL)   09/15/18  08:50    


 


Large Platelets  Present   09/15/18  08:50    


 


Hypochromasia (manual)  Slight   09/15/18  08:50    


 


Anisocytosis (manual)  Slight   09/15/18  08:50    


 


Microcytosis (manual)  Slight   09/15/18  08:50    


 


Ovalocytes  Slight   09/15/18  08:50    


 


PT  12.1 Seconds (9.8-13.1)   09/13/18  13:20    


 


INR  1.1   09/13/18  13:20    


 


APTT  34.3 Seconds (25.6-37.1)   09/13/18  13:20    


 


Sodium  137 mmol/l (132-148)   09/15/18  08:50    


 


Potassium  3.8 MMOL/L (3.6-5.0)   09/15/18  08:50    


 


Chloride  105 mmol/L ()   09/15/18  08:50    


 


Carbon Dioxide  23 mmol/L (22-30)   09/15/18  08:50    


 


Anion Gap  13  (10-20)   09/15/18  08:50    


 


BUN  11 mg/dl (7-17)   09/15/18  08:50    


 


Creatinine  0.8 mg/dl (0.7-1.2)   09/15/18  08:50    


 


Est GFR ( Amer)  > 60   09/15/18  08:50    


 


Est GFR (Non-Af Amer)  > 60   09/15/18  08:50    


 


Random Glucose  91 mg/dL ()   09/15/18  08:50    


 


Calcium  8.5 mg/dL (8.4-10.2)   09/15/18  08:50    


 


Total Bilirubin  0.9 mg/dl (0.2-1.3)   09/15/18  08:50    


 


AST  23 U/L (14-36)   09/15/18  08:50    


 


ALT  24 U/L (9-52)   09/15/18  08:50    


 


Alkaline Phosphatase  85 U/L ()   09/15/18  08:50    


 


Total Protein  7.1 G/DL (6.3-8.2)   09/15/18  08:50    


 


Albumin  3.7 g/dL (3.5-5.0)   09/15/18  08:50    


 


Globulin  3.4 gm/dL (2.2-3.9)   09/15/18  08:50    


 


Albumin/Globulin Ratio  1.1  (1.0-2.1)   09/15/18  08:50    


 


Lipase  117 U/L ()   09/13/18  13:20    


 


Urine Color  Yellow  (YELLOW)   09/13/18  13:20    


 


Urine Clarity  Sl cloudy  (Clear)   09/13/18  13:20    


 


Urine pH  7.0  (5.0-8.0)   09/13/18  13:20    


 


Ur Specific Gravity  1.018  (1.003-1.030)   09/13/18  13:20    


 


Urine Protein  Negative mg/dL (NEGATIVE)   09/13/18  13:20    


 


Urine Glucose (UA)  Neg mg/dL (Normal)   09/13/18  13:20    


 


Urine Ketones  Negative mg/dL (NEGATIVE)   09/13/18  13:20    


 


Urine Blood  Negative  (NEGATIVE)   09/13/18  13:20    


 


Urine Nitrate  Negative  (NEGATIVE)   09/13/18  13:20    


 


Urine Bilirubin  Negative  (NEGATIVE)   09/13/18  13:20    


 


Urine Urobilinogen  0.2-1.0 mg/dL (0.2-1.0)   09/13/18  13:20    


 


Ur Leukocyte Esterase  Small Lexie/uL (Negative)   09/13/18  13:20    


 


Urine RBC (Auto)  7 /hpf (0-3)  H  09/13/18  13:20    


 


Urine Microscopic WBC  2 /hpf (0-5)   09/13/18  13:20    


 


Ur Squamous Epith Cells  13 /hpf (0-5)  H  09/13/18  13:20    


 


Urine Bacteria  Rare  (<OCC)   09/13/18  13:20    


 


Blood Type  O POSITIVE   09/13/18  12:30    


 


Antibody Screen  Negative   09/13/18  12:30    


 


BBK History Checked  Patient has bt   09/13/18  12:30    














- Hospital Course


Hospital Course: 





37F presents with abd pain since Monday. Pain started off as kristopher-umbilical, 

then migrated to RLQ. Patient states that pain is constant, worse with activity.

She denies changes in appetite. Patient also denies fever/chills, 

nausea/vomiting or diarrhea. Her only complaint is the pain.  In ED, CT was done

with findings consistent with ruptured appendicitis with phelgmonous changes.





Patient was admitted for ruptured appendicitis. She was made NPO, given IVF and 

started on IV antibiotics. The initial plan was to treat conservatively for 24 

hours to see if there was any improvement. After the frist 24 hours, patient 

pain not did improve and actually worsened. The decision was made by Dr. Shaver to take the patient to the OR for laparoscopic appendectomy on 9/14. 

Patient tolerated the procedure well without any complications. Patient 

continued to be given IVF and IV antibiotics. Over the next 2 days, patient 

clinically improved. On 9/16, patient was feeling well, her pain improved and 

WBC remained normal. She was tolerating diet and ambulating without difficulty. 

Patient was deemed medically stable for discharge and instructed to follow up 

within 1-2 weeks as outpatient with Dr. Shaver.








(This is a summary of the hospital course. Please refer to EMR for more 

details.)





- Date & Time of H&P


Date of H&P: 09/13/18


Time of H&P: 19:08





Discharge Exam





- Head Exam


Head Exam: ATRAUMATIC, NORMOCEPHALIC





- Eye Exam


Eye Exam: EOMI, Normal appearance


Pupil Exam: PERRL





- ENT Exam


ENT Exam: Mucous Membranes Moist





- Respiratory Exam


Respiratory Exam: NORMAL BREATHING PATTERN





- Cardiovascular Exam


Cardiovascular Exam: REGULAR RHYTHM





- GI/Abdominal Exam


GI & Abdominal Exam: Normal Bowel Sounds, Soft, Tenderness (very mild at 

surgical sites).  absent: Distended, Firm, Guarding, Pulsatile Mass, Rebound, 

Rigid


Additional comments: 





surgical sites with dermabond - clean, dry and intact





- Extremities Exam


Extremities exam: normal capillary refill





- Neurological Exam


Neurological exam: Alert, CN II-XII Intact, Normal Gait, Oriented x3





- Psychiatric Exam


Psychiatric exam: Normal Affect, Normal Mood





- Skin


Skin Exam: Dry, Intact, Normal Color, Warm





Discharge Plan





- Discharge Medications


Prescriptions: 


Docusate Sodium [Colace] 100 mg PO BID #30 capsule


Ibuprofen [Motrin Tab] 800 mg PO Q8H PRN #21 tab


 PRN Reason: Pain, Moderate (4-7)





- Follow Up Plan


Condition: STABLE


Disposition: HOME/ ROUTINE


Instructions:  Appendicitis in Adults, Appendectomy, Laparoscopic Surgery (DC)


Additional Instructions: 


Take Ibuprofen and colace as prescribed


NO heavy lifting for 3-4 weeks over 20 lbs


Keep area clean and dry


May shower


No soaking, bathing or swimming for a few weeks


Follow up as outpatient with Dr. Shaver within 1-2 weeks


Please return to ED if symptoms persist or condition worsens


Call Dr. Shaver' office for any issues [phone]





Referrals: 


Milton Shaver MD [Staff Provider] -

## 2018-10-02 NOTE — OP
PROCEDURE DATE:  09/14/2018



PREOPERATIVE DIAGNOSIS:  Acute appendicitis.



POSTOPERATIVE DIAGNOSIS:  Acute appendicitis.



SURGERY:  Laparoscopic appendectomy.



SURGEON:  Milton Shaver MD



TYPE OF ANESTHESIA:  General endotracheal.



DESCRIPTION OF PROCEDURE:  The patient was brought to the operating room

and placed on the operating table in the supine position.  After smooth

induction of general endotracheal anesthesia, Venodyne boots were placed

both in the legs and prophylactic IV antibiotics were given.  The entire

abdomen was prepped and draped under usual sterile fashion.  Using #15

blade, infraumbilical midline incision was performed in a vertical fashion

which was brought down to the subcutaneous tissue using Bovie

electrocautery.  The linea alba was incised and the peritoneal cavity was

accessed.  The Jose Raul trocar was inserted to the opening and connected with

the CO2 tank, generating a pneumoperitoneum up to 15 mmHg.  A 10 mm

surgical laparoscopic video camera was used to inspect the abdomen. 

Appendix appeared to be enlarged and inflamed with attachments to the cecum

and the terminal ileum ____ reactive fluid in the right pericolic gutter

and right pelvis.



Under direct laparoscopic visualization, two 5 mm ports were inserted in

the lower abdomen through which an endograsper and 5 mm LigaSure were

inserted, and the appendix was grasped from the tip and retracted upwards

exposing the mesoappendix, which was sealed in a sequential fashion using

the 5 mm LigaSure, all the way to the base of the appendix.  The camera was

fixed to  5 mm 30-degree and inserted through the left lower quadrant port,

and through the infraumbilical port, an Endo ARABELLA 45 blue stapler was

inserted and fired across the base of the appendix.  The specimen was

placed in an Endobag, removed from the operating field and pathology

appropriately labelled for permanent section.  The abdomen was irrigated

with copious amounts of warm normal saline.  There was no evidence of

bleeding or succus discharge from staple line to the base of the appendix. 

Infraumbilical port was removed, and the fascia was closed with interrupted

0 Vicryl stitch.  The integrity of the fascial closure was checked by

inserting a 5 mm laparoscopic video camera through the left lower quadrant

port.  It was also noted that there was no evidence of bleeding, bowel, or

omental entrapment.  The remaining two 5 mm ports were removed under direct

laparoscopic visualization, and there was no evidence of bleeding from the

port sites.  The skin incision were closed with staples, and sterile

dressings were applied.



At the end of the surgery, the counts of instruments, gauze, and needles

were correct x2.  The patient tolerated the surgery well and was

transferred in stable condition to the recovery room.





__________________________________________

Milton Shaver MD



DD:  10/01/2018 15:13:20

DT:  10/02/2018 1:09:43

Job # [id number]